# Patient Record
Sex: MALE | Race: WHITE | NOT HISPANIC OR LATINO | URBAN - METROPOLITAN AREA
[De-identification: names, ages, dates, MRNs, and addresses within clinical notes are randomized per-mention and may not be internally consistent; named-entity substitution may affect disease eponyms.]

---

## 2023-12-18 ENCOUNTER — OFFICE VISIT (OUTPATIENT)
Dept: SURGERY | Facility: CLINIC | Age: 63
End: 2023-12-18
Payer: COMMERCIAL

## 2023-12-18 DIAGNOSIS — R10.84 GENERALIZED ABDOMINAL PAIN: Primary | ICD-10-CM

## 2023-12-18 PROCEDURE — 99213 OFFICE O/P EST LOW 20 MIN: CPT | Performed by: SURGERY

## 2023-12-18 PROCEDURE — 1036F TOBACCO NON-USER: CPT | Performed by: SURGERY

## 2023-12-18 NOTE — PROGRESS NOTES
Subjective   Patient ID: Onel Batista is a 63 y.o. male who presents for New Patient Visit and Abdominal Pain.    HPI patient has bilateral inguinal hernia robotically repair in the May.  Review of Systems consistent with intermittent abdominal discomfort.  Review of ultrasound systems negative  Physical Exam pupils equal bilaterally, oral mucosa moist, bilateral breath sounds, regular rhythm and rate, abdomen is soft, nontender, no evidence of hernia recurrence.  All incisions healed by the primary intention.  No focal neurological motor deficits    Objective I reviewed all available data including lab results, radiological studies, previous reports and notes.    No diagnosis found.   There is no problem list on file for this patient.     No Known Allergies   Medication Documentation Review Audit       Reviewed by Amy Early MA (Medical Assistant) on 12/18/23 at 1344      Medication Order Taking? Sig Documenting Provider Last Dose Status            No Medications to Display                                   History reviewed. No pertinent past medical history.  Social History     Tobacco Use   Smoking Status Never   Smokeless Tobacco Never     No family history on file.   Past Surgical History:   Procedure Laterality Date    HERNIA REPAIR         Assessment/Plan   Patient with intermittent abdominal discomfort.  No pain.  Clinically there is no evidence of hernia recurrence.  No evidence of surgical complications.  No further assessment is indicated.  I recommend to follow-up in office if comfort persist next 3 months, at that time it would be reasonable to do a CAT scan to rule out any other pathology.  Grady Castro MD